# Patient Record
Sex: FEMALE | Race: WHITE | NOT HISPANIC OR LATINO | Employment: OTHER | URBAN - METROPOLITAN AREA
[De-identification: names, ages, dates, MRNs, and addresses within clinical notes are randomized per-mention and may not be internally consistent; named-entity substitution may affect disease eponyms.]

---

## 2019-06-07 ASSESSMENT — MIFFLIN-ST. JEOR: SCORE: 1329.97

## 2019-07-26 ENCOUNTER — ANESTHESIA - HEALTHEAST (OUTPATIENT)
Dept: SURGERY | Facility: CLINIC | Age: 79
End: 2019-07-26

## 2019-07-26 ENCOUNTER — SURGERY - HEALTHEAST (OUTPATIENT)
Dept: SURGERY | Facility: CLINIC | Age: 79
End: 2019-07-26

## 2019-07-26 ASSESSMENT — MIFFLIN-ST. JEOR
SCORE: 1312.96
SCORE: 1312.96

## 2021-05-30 NOTE — ANESTHESIA PROCEDURE NOTES
Spinal Block    Patient location during procedure: OR  Start time: 7/26/2019 9:54 AM  End time: 7/26/2019 9:57 AM  Reason for block: primary anesthetic    Staffing:  Performing  Anesthesiologist: Heriberto Mendoza MD    Preanesthetic Checklist  Completed: patient identified, risks, benefits, and alternatives discussed, timeout performed, consent obtained, airway assessed, oxygen available, suction available, emergency drugs available and hand hygiene performed  Spinal Block  Patient position: sitting  Prep: ChloraPrep and site prepped and draped  Patient monitoring: heart rate, cardiac monitor, continuous pulse ox and blood pressure  Approach: midline  Location: L3-4  Injection technique: single-shot  Needle type: pencil-tip   Needle gauge: 24 G

## 2021-05-30 NOTE — ANESTHESIA PREPROCEDURE EVALUATION
Anesthesia Evaluation      Patient summary reviewed     Airway   Mallampati: II  Neck ROM: full   Pulmonary - negative ROS and normal exam    breath sounds clear to auscultation                         Cardiovascular - normal exam  (+) hypertension well controlled, dysrhythmias, ,     Rhythm: regular  Rate: normal,         Neuro/Psych      Endo/Other    (+) arthritis,      GI/Hepatic/Renal            Dental - normal exam                        Anesthesia Plan  Planned anesthetic: spinal    ASA 2   Induction: intravenous   Anesthetic plan and risks discussed with: patient    Post-op plan: routine recovery

## 2021-05-30 NOTE — ANESTHESIA CARE TRANSFER NOTE
Last vitals:   Vitals:    07/26/19 1104   BP: 101/49   Pulse: 66   Resp: 16   Temp: 36.7  C (98  F)   SpO2: 100%     Patient's level of consciousness is drowsy  Spontaneous respirations: yes  Maintains airway independently: yes  Dentition unchanged: yes  Oropharynx: oropharynx clear of all foreign objects    QCDR Measures:  ASA# 20 - Surgical Safety Checklist: WHO surgical safety checklist completed prior to induction    PQRS# 430 - Adult PONV Prevention: 4558F - Pt received => 2 anti-emetic agents (different classes) preop & intraop  ASA# 8 - Peds PONV Prevention: NA - Not pediatric patient, not GA or 2 or more risk factors NOT present  PQRS# 424 - [place]-op Temp Management: 4559F - At least one body temp DOCUMENTED => 35.5C or 95.9F within required timeframe  PQRS# 426 - PACU Transfer Protocol: - Transfer of care checklist used  ASA# 14 - Acute Post-op Pain: ASA14B - Patient did NOT experience pain >= 7 out of 10

## 2021-05-30 NOTE — ANESTHESIA POSTPROCEDURE EVALUATION
Patient: Ana Arciniega  RIGHT TOTAL HIP ARTHROPLASTY  Anesthesia type: spinal    Patient location: PACU  Last vitals:   Vitals Value Taken Time   /74 7/26/2019  1:10 PM   Temp 36.4  C (97.5  F) 7/26/2019  1:10 PM   Pulse 68 7/26/2019  1:10 PM   Resp 16 7/26/2019  1:10 PM   SpO2 95 % 7/26/2019  1:10 PM     Post vital signs: stable  Level of consciousness: awake and responds to simple questions  Post-anesthesia pain: pain controlled  Post-anesthesia nausea and vomiting: no  Pulmonary: unassisted, return to baseline  Cardiovascular: stable and blood pressure at baseline  Hydration: adequate  Anesthetic events: no    QCDR Measures:  ASA# 11 - [place]-op Cardiac Arrest: ASA11B - Patient did NOT experience unanticipated cardiac arrest  ASA# 12 - [place]-op Mortality Rate: ASA12B - Patient did NOT die  ASA# 13 - PACU Re-Intubation Rate: NA - No ETT / LMA used for case  ASA# 10 - Composite Anes Safety: ASA10A - No serious adverse event    Additional Notes:

## 2021-06-02 ENCOUNTER — RECORDS - HEALTHEAST (OUTPATIENT)
Dept: ADMINISTRATIVE | Facility: CLINIC | Age: 81
End: 2021-06-02

## 2021-06-03 ENCOUNTER — RECORDS - HEALTHEAST (OUTPATIENT)
Dept: ADMINISTRATIVE | Facility: CLINIC | Age: 81
End: 2021-06-03

## 2021-06-03 VITALS — HEIGHT: 68 IN | BODY MASS INDEX: 26.71 KG/M2 | WEIGHT: 176.25 LBS

## 2021-06-16 PROBLEM — M16.9 HIP OSTEOARTHRITIS: Status: ACTIVE | Noted: 2019-07-26

## 2023-03-20 ENCOUNTER — HOSPITAL ENCOUNTER (EMERGENCY)
Facility: CLINIC | Age: 83
Discharge: HOME | End: 2023-03-20
Attending: EMERGENCY MEDICINE | Admitting: EMERGENCY MEDICINE
Payer: COMMERCIAL

## 2023-03-20 ENCOUNTER — APPOINTMENT (OUTPATIENT)
Dept: RADIOLOGY | Facility: CLINIC | Age: 83
End: 2023-03-20
Attending: EMERGENCY MEDICINE
Payer: COMMERCIAL

## 2023-03-20 VITALS
HEIGHT: 68 IN | DIASTOLIC BLOOD PRESSURE: 68 MMHG | WEIGHT: 162 LBS | SYSTOLIC BLOOD PRESSURE: 152 MMHG | OXYGEN SATURATION: 98 % | HEART RATE: 86 BPM | TEMPERATURE: 98.2 F | BODY MASS INDEX: 24.55 KG/M2 | RESPIRATION RATE: 16 BRPM

## 2023-03-20 DIAGNOSIS — R07.9 ACUTE CHEST PAIN: ICD-10-CM

## 2023-03-20 LAB
ANION GAP SERPL CALCULATED.3IONS-SCNC: 12 MMOL/L (ref 5–18)
ATRIAL RATE - MUSE: 84 BPM
BNP SERPL-MCNC: 79 PG/ML (ref 0–167)
BUN SERPL-MCNC: 13 MG/DL (ref 8–28)
CALCIUM SERPL-MCNC: 9 MG/DL (ref 8.5–10.5)
CHLORIDE BLD-SCNC: 103 MMOL/L (ref 98–107)
CO2 SERPL-SCNC: 24 MMOL/L (ref 22–31)
CREAT SERPL-MCNC: 0.76 MG/DL (ref 0.6–1.1)
DIASTOLIC BLOOD PRESSURE - MUSE: NORMAL MMHG
ERYTHROCYTE [DISTWIDTH] IN BLOOD BY AUTOMATED COUNT: 11.9 % (ref 10–15)
GFR SERPL CREATININE-BSD FRML MDRD: 77 ML/MIN/1.73M2
GLUCOSE BLD-MCNC: 140 MG/DL (ref 70–125)
HCT VFR BLD AUTO: 41.3 % (ref 35–47)
HGB BLD-MCNC: 13.2 G/DL (ref 11.7–15.7)
HOLD SPECIMEN: NORMAL
INTERPRETATION ECG - MUSE: NORMAL
MCH RBC QN AUTO: 31.2 PG (ref 26.5–33)
MCHC RBC AUTO-ENTMCNC: 32 G/DL (ref 31.5–36.5)
MCV RBC AUTO: 98 FL (ref 78–100)
P AXIS - MUSE: 82 DEGREES
PLATELET # BLD AUTO: 237 10E3/UL (ref 150–450)
POTASSIUM BLD-SCNC: 3.9 MMOL/L (ref 3.5–5)
PR INTERVAL - MUSE: 190 MS
QRS DURATION - MUSE: 100 MS
QT - MUSE: 380 MS
QTC - MUSE: 449 MS
R AXIS - MUSE: 42 DEGREES
RBC # BLD AUTO: 4.23 10E6/UL (ref 3.8–5.2)
SODIUM SERPL-SCNC: 139 MMOL/L (ref 136–145)
SYSTOLIC BLOOD PRESSURE - MUSE: NORMAL MMHG
T AXIS - MUSE: 69 DEGREES
TROPONIN I SERPL-MCNC: <0.01 NG/ML (ref 0–0.29)
TROPONIN I SERPL-MCNC: <0.01 NG/ML (ref 0–0.29)
VENTRICULAR RATE- MUSE: 84 BPM
WBC # BLD AUTO: 9.4 10E3/UL (ref 4–11)

## 2023-03-20 PROCEDURE — 93005 ELECTROCARDIOGRAM TRACING: CPT | Performed by: EMERGENCY MEDICINE

## 2023-03-20 PROCEDURE — 84484 ASSAY OF TROPONIN QUANT: CPT | Performed by: EMERGENCY MEDICINE

## 2023-03-20 PROCEDURE — 82310 ASSAY OF CALCIUM: CPT | Performed by: EMERGENCY MEDICINE

## 2023-03-20 PROCEDURE — 71046 X-RAY EXAM CHEST 2 VIEWS: CPT

## 2023-03-20 PROCEDURE — 36415 COLL VENOUS BLD VENIPUNCTURE: CPT | Performed by: EMERGENCY MEDICINE

## 2023-03-20 PROCEDURE — 83880 ASSAY OF NATRIURETIC PEPTIDE: CPT | Performed by: EMERGENCY MEDICINE

## 2023-03-20 PROCEDURE — 85027 COMPLETE CBC AUTOMATED: CPT | Performed by: EMERGENCY MEDICINE

## 2023-03-20 PROCEDURE — 99285 EMERGENCY DEPT VISIT HI MDM: CPT | Mod: 25

## 2023-03-20 PROCEDURE — 84484 ASSAY OF TROPONIN QUANT: CPT | Mod: 91 | Performed by: EMERGENCY MEDICINE

## 2023-03-20 RX ORDER — HYDROCODONE BITARTRATE AND ACETAMINOPHEN 5; 325 MG/1; MG/1
1 TABLET ORAL EVERY 4 HOURS PRN
Qty: 10 TABLET | Refills: 0 | Status: SHIPPED | OUTPATIENT
Start: 2023-03-20 | End: 2023-03-23

## 2023-03-20 ASSESSMENT — ENCOUNTER SYMPTOMS
RHINORRHEA: 1
NAUSEA: 0
DIZZINESS: 1
COUGH: 1
CHILLS: 0
FEVER: 0
SHORTNESS OF BREATH: 1
PALPITATIONS: 0
SINUS PRESSURE: 1
VOMITING: 0

## 2023-03-20 ASSESSMENT — ACTIVITIES OF DAILY LIVING (ADL): ADLS_ACUITY_SCORE: 35

## 2023-03-20 NOTE — ED TRIAGE NOTES
Pt presents via EMS with shortness of breath and chest pain beginning yesterday, started with a cough 3 days ago. EMS gave 100mcg of Fentanyl, 324 ASA, and 400mL of NS en route. No apparent distress noted in triage. Pt has history of A-fib. Not on blood thinners

## 2023-03-20 NOTE — ED PROVIDER NOTES
Emergency Department Encounter      NAME: Ana Arciniega  AGE: 82 year old female  YOB: 1940  MRN: [id number]  EVALUATION DATE & TIME: No admission date for patient encounter.    PCP: Irasema Greenfield    ED PROVIDER: Nitin Elizondo M.D.      Chief Complaint   Patient presents with     Shortness of Breath     Chest Pain         FINAL IMPRESSION:  1. Acute chest pain        MEDICAL DECISION MAKIN:36 PM I met with the patient, obtained history, performed an initial exam, and discussed options and plan for diagnostics and treatment here in the ED.   9:10 PM We discussed the plan for discharge and the patient is agreeable. Reviewed supportive cares, symptomatic treatment, outpatient follow up, and reasons to return to the Emergency Department. All questions and concerns were addressed. Patient to be discharged by ED RN.      This patient is a 82-year-old female presents with chest pain.  She said that she had been getting some nasal congestion and discharge with a cough last week.  Then around  she began to have left-sided chest pain that she described as spasms of pain.  Today she had some shortness of breath while grocery shopping.  She was brought in by ambulance.  She had received fentanyl for the pain and aspirin in route.  She says the pain is sharp and similar to pericarditis pain that she had had in the past.  On exam the patient had an unremarkable exam.  Her EKG did not show any sign of ischemia or MI or pericarditis.  I independently reviewed and interpreted the EKG  A initial troponin was not detectable and this was repeated after 2 hours and again undetectable.  A chest x-ray was done as well and this did not show any cause for pain/shortness of breath.  No pneumonia or CHF.  I independently reviewed and interpreted the chest x-ray.  I considered admitting the patient because of her age but after I discussed this with her through shared decision-making it was determined to have  her follow-up in the rapid access clinic.    Pertinent Labs & Imaging studies reviewed. (See chart for details)    The importance of close follow up was discussed. We reviewed warning signs and symptoms, and I instructed Ms. Arciniega to return to the emergency department immediately if she develops any new or worsening symptoms. I provided additional verbal discharge instructions. Ms. Arciniega expressed understanding and agreement with this plan of care, her questions were answered, and she was discharged in stable condition.     Medical Decision Making    History:    Supplemental history from: N/A    External Record(s) reviewed: Documented in chart, if applicable.    Work Up:    Chart documentation includes differential considered and any EKGs or imaging independently interpreted by provider, where specified.    In additional to work up documented, I considered the following work up: Documented in chart, if applicable.    External consultation:    Discussion of management with another provider: Documented in chart, if applicable    Complicating factors:    Care impacted by chronic illness: N/A    Care affected by social determinants of health: Access to Medical Care    Disposition considerations: Discharge. I prescribed additional prescription strength medication(s) as charted. See documentation for any additional details.        MEDICATIONS GIVEN IN THE EMERGENCY:  Medications - No data to display    NEW PRESCRIPTIONS STARTED AT TODAY'S ER VISIT:  Discharge Medication List as of 3/20/2023  9:13 PM      START taking these medications    Details   HYDROcodone-acetaminophen (NORCO) 5-325 MG tablet Take 1 tablet by mouth every 4 hours as needed for pain, Disp-10 tablet, R-0, Local Print                =================================================================    HPI    Patient information was obtained from: patient     Use of : N/A         Ana Arciniega is a 82 year old female with a past medical history  "of beti, who presents to the ED for evaluation of chest pain and shortness of breath.    The patient reports she was sick starting last week Thursday 3/16/23 with some sinus congestion, cough, rhinorrhea. States has been gradually improving, but on Sunday night 3/18, the patient began having left sided chest pain. States it kept her awake most of that night. This pain has persisted since, and patient even reports some \"spasms\" of pain in her left chest. Today, the patient was walking to her car from grocery shopping and experienced sever shortness of breath. She says this is very unusual for her as she is relatively fit and exercises several times per week. She laid down to rest this afternoon, which she says she never does, and her chest pain and shortness of breath continued to worsen. Patient also experienced some dizziness at that time. She thus called 911, who brought the patient here to the ED. The patient reports getting several doses of fentanyl and aspirin in the ambulance. She states she did not get any nitroglycerin. Since arriving to the ED, her pain has improved some. Is 2/10 in severity right now while she is just sitting. The pain is still sharp in quality, though, and has been radiating some to her left axilla area. The patient notes she had similar pain when she had pericarditis several years ago. Patient denies additional medical concerns or complaints at this time.        REVIEW OF SYSTEMS   Review of Systems   Constitutional: Negative for chills and fever.   HENT: Positive for congestion (resolved), rhinorrhea and sinus pressure (resolved).    Respiratory: Positive for cough and shortness of breath.    Cardiovascular: Positive for chest pain (left sided, radiates to left axilla). Negative for palpitations and leg swelling.   Gastrointestinal: Negative for nausea and vomiting.   Neurological: Positive for dizziness.   All other systems reviewed and are negative.       PAST MEDICAL " HISTORY:  History reviewed. No pertinent past medical history.    PAST SURGICAL HISTORY:  Past Surgical History:   Procedure Laterality Date     JOINT REPLACEMENT       ZZC TOTAL HIP ARTHROPLASTY Right 7/26/2019    Procedure: RIGHT TOTAL HIP ARTHROPLASTY;  Surgeon: Marcus Mcelroy MD;  Location: [facility];  Service: Orthopedics       CURRENT MEDICATIONS:    No current facility-administered medications for this encounter.    Current Outpatient Medications:      HYDROcodone-acetaminophen (NORCO) 5-325 MG tablet, Take 1 tablet by mouth every 4 hours as needed for pain, Disp: 10 tablet, Rfl: 0     acetaminophen (TYLENOL) 500 MG tablet, [ACETAMINOPHEN (TYLENOL) 500 MG TABLET] Take 1,000 mg by mouth every 6 (six) hours as needed.       , Disp: , Rfl:      aspirin 325 MG EC tablet, [ASPIRIN 325 MG EC TABLET] Take 1 tablet (325 mg total) by mouth 2 (two) times a day., Disp: , Rfl: 0     calcium carbonate-vitamin D3 600 mg(1,500mg) -200 unit per tablet, [CALCIUM CARBONATE-VITAMIN D3 600 MG(1,500MG) -200 UNIT PER TABLET] Take 1 tablet by mouth daily.       , Disp: , Rfl:      carboxymethylcellulose (REFRESH LIQUIGEL) 1 % ophthalmic solution, [CARBOXYMETHYLCELLULOSE (REFRESH LIQUIGEL) 1 % OPHTHALMIC SOLUTION] Administer 1 drop to both eyes daily as needed., Disp: , Rfl:      cycloSPORINE (RESTASIS) 0.05 % ophthalmic emulsion, [CYCLOSPORINE (RESTASIS) 0.05 % OPHTHALMIC EMULSION] Administer 1 drop to both eyes 2 (two) times a day as needed., Disp: , Rfl:      diltiazem (CARDIZEM CD) 240 MG 24 hr capsule, [DILTIAZEM (CARDIZEM CD) 240 MG 24 HR CAPSULE] Take 240 mg by mouth daily.       , Disp: , Rfl:      levothyroxine (SYNTHROID, LEVOTHROID) 175 MCG tablet, [LEVOTHYROXINE (SYNTHROID, LEVOTHROID) 175 MCG TABLET] Take 175 mcg by mouth Daily at 6:00 am.       , Disp: , Rfl:      multivitamin (ONE A DAY) per tablet, [MULTIVITAMIN (ONE A DAY) PER TABLET] Take 1 tablet by mouth daily.       , Disp: , Rfl:      propafenone  "(RYTHMOL) 150 MG tablet, [PROPAFENONE (RYTHMOL) 150 MG TABLET] Take 150 mg by mouth every 8 (eight) hours. Takes at 0700, 1500, 2300      , Disp: , Rfl:      senna-docusate (PERICOLACE) 8.6-50 mg tablet, [SENNA-DOCUSATE (PERICOLACE) 8.6-50 MG TABLET] Take 1 tablet by mouth 2 (two) times a day. Hold if more than 1 bowel movement in a day., Disp: , Rfl: 0     traMADol (ULTRAM) 50 mg tablet, [TRAMADOL (ULTRAM) 50 MG TABLET] Take 1 tablet (50 mg total) by mouth every 6 (six) hours., Disp: 8 tablet, Rfl: 0    ALLERGIES:  Allergies   Allergen Reactions     Diclofenac Angioedema     Pt would not get swelling in her lips if she took Zyrtec at the same time as taking diclofenac.Jeff CMA/CDT  4/20/2009   4:14 PM       Naproxen Angioedema and Rash     Oxycodone-Acetaminophen Nausea and Vomiting     (Vicodin ok), Patient does NOT want oxycodone     Penicillins Hives     Patient not sure of reaction      Flecainide Hives     Lip swelling     Ibuprofen Rash     Tolmetin Rash       FAMILY HISTORY:  History reviewed. No pertinent family history.    SOCIAL HISTORY:   Social History     Socioeconomic History     Marital status:    Tobacco Use     Smoking status: Former     Smokeless tobacco: Never   Substance and Sexual Activity     Alcohol use: Yes     Comment: Alcoholic Drinks/day: 1/month     Drug use: Never       PHYSICAL EXAM:    Vitals: BP (!) 152/68   Pulse 86   Temp 98.2  F (36.8  C) (Temporal)   Resp 16   Ht 1.715 m (5' 7.5\")   Wt 73.5 kg (162 lb)   SpO2 98%   BMI 25.00 kg/m     Constitutional: Well developed, well nourished. Comfortable appearing.  HEAD:Normocephalic, atraumatic,   Eyes: PERRLA, EOM intact, conjunctiva clear, no discharge  ENT: mucous membranes moist, nose normal.   Neck- Supple, gross ROM intact.  No JVD.  No palpable nodes.  Pulmonary: Clear to auscultation bilaterally, no respiratory distress, no wheezing, speaks full sentences easily.  Chest: No chest wall " tenderness  Cardiovascular: Normal heart rate, regular rhythm, no murmurs. No lower extremity edema, 2+ DP pulses.   GI: Soft, no tenderness to deep palpation in all quadrants, not distended, no masses.  No hepatosplenomegaly.  Musculoskeletal: Moving all 4 extremities intentionally and without pain. No obvious deformity. No calf swelling or tenderness.  Back: No CVA tenderness  Skin: Warm, dry, no rash.  Neurologic: Alert & oriented x 3, speech clear, moving all extremities spontaneously   Psychiatric: Affect normal, cooperative.     LAB:  All pertinent labs reviewed and interpreted.  Labs Ordered and Resulted from Time of ED Arrival to Time of ED Departure   BASIC METABOLIC PANEL - Abnormal       Result Value    Sodium 139      Potassium 3.9      Chloride 103      Carbon Dioxide (CO2) 24      Anion Gap 12      Urea Nitrogen 13      Creatinine 0.76      Calcium 9.0      Glucose 140 (*)     GFR Estimate 77     TROPONIN I - Normal    Troponin I <0.01     CBC WITH PLATELETS - Normal    WBC Count 9.4      RBC Count 4.23      Hemoglobin 13.2      Hematocrit 41.3      MCV 98      MCH 31.2      MCHC 32.0      RDW 11.9      Platelet Count 237     B-TYPE NATRIURETIC PEPTIDE ([facility] ONLY) - Normal    BNP 79     TROPONIN I - Normal    Troponin I <0.01         RADIOLOGY:  XR Chest 2 Views   Final Result   IMPRESSION: Negative chest.          EKG:   Performed at: 1537 3/20/23.  Impression: Sinus rhythm. Normal ECG.  Rate: 84 BPM.  Rhythm: Sinus.  QRS Interval: 100 ms.  QTc Interval: 449 ms.  Comparison: no prior ECGs available.  I have independently reviewed and interpreted the EKG(s) documented above.     PROCEDURES:   Procedures       I, Tanvi Gaines, am serving as a scribe to document services personally performed by Dr. Nitin Elizondo based on my observation and the provider's statements to me. I, Nitin Elizondo M.D. attest that Tanvi Gaines is acting in a scribe capacity, has observed my performance of the services  and has documented them in accordance with my direction.      Nitin Elizondo M.D.  Emergency Medicine  [facility] EMERGENCY ROOM  [address] [facility] [address]  [phone]  Dept: [phone]     Nitin Elizondo MD  04/14/23 0739

## 2023-03-21 NOTE — ED NOTES
Discharge instructions discussed with patient and son. All questions answered at time of discharge and patient agreeable with discharge plan.